# Patient Record
(demographics unavailable — no encounter records)

---

## 2025-02-13 NOTE — HISTORY OF PRESENT ILLNESS
[FreeTextEntry1] : Patient came to the office today with her daughter as .  The patient has been experiencing constipation and is concerned.  He has been taking stool softener and some senna with only partial relief.  He has Parkinson's disease and this seems to be related.  The patient denies associated nausea vomiting fever chills rectal bleeding or melena.  He has bowel movements up to 4 days apart.  Last colonoscopy was performed several years ago.

## 2025-02-13 NOTE — ASSESSMENT
[FreeTextEntry1] : Impression and plan Patient came to the office today to discuss recent constipation.  I made some suggestions about adding fiber and fluid MiraLAX and magnesium for laxative effect.  A colonoscopy will be arranged.  Patient was noted to have several point elevation of alkaline phosphatase.  This is chronic.  I will recommend abdominal sonography to better evaluate along with blood work today.  Patient has had recent previous ERCP with stone extraction at [facility].  Call back for results of blood work and sonogram daughter will update me regarding progress with bowels.  Further suggestions will follow.